# Patient Record
Sex: MALE | Race: WHITE | Employment: FULL TIME | URBAN - METROPOLITAN AREA
[De-identification: names, ages, dates, MRNs, and addresses within clinical notes are randomized per-mention and may not be internally consistent; named-entity substitution may affect disease eponyms.]

---

## 2017-02-03 ENCOUNTER — HOSPITAL ENCOUNTER (OUTPATIENT)
Dept: CV DIAGNOSTICS | Age: 47
Discharge: HOME | End: 2017-02-03
Attending: PREVENTIVE MEDICINE

## 2017-02-03 ENCOUNTER — OFFICE VISIT (OUTPATIENT)
Dept: OTHER | Age: 47
End: 2017-02-03
Attending: PREVENTIVE MEDICINE

## 2017-02-03 DIAGNOSIS — Z00.00 PHYSICAL EXAM: Primary | ICD-10-CM

## 2017-02-03 DIAGNOSIS — Z00.00 PHYSICAL EXAM: ICD-10-CM

## 2017-02-04 NOTE — H&P
[facility]    PATIENT'S NAME: Yolette Thakkar   ATTENDING PHYSICIAN: Sheryle Dibbles, M.D.    PATIENT ACCOUNT #: [de-identified] LOCATION: [place]   MEDICAL RECORD #: [id number] YOB: 1970   ADMISSION DATE: 02/03/2017       [facility] and deferred it to PCP. Please see comprehensive medical form for details. ADDITIONAL TEST RESULTS:  Additional testing was requested and was reviewed. The patient reports no problems using SCBA or respirator mask.   No chest pain, shortness of breat

## 2018-02-07 ENCOUNTER — HOSPITAL ENCOUNTER (OUTPATIENT)
Dept: GENERAL RADIOLOGY | Age: 48
Discharge: HOME | End: 2018-02-07
Attending: FAMILY MEDICINE

## 2018-02-07 ENCOUNTER — OFFICE VISIT (OUTPATIENT)
Dept: OTHER | Age: 48
End: 2018-02-07
Attending: FAMILY MEDICINE

## 2018-02-07 DIAGNOSIS — Z00.00 PHYSICAL EXAM: Primary | ICD-10-CM

## 2018-02-07 LAB
ATRIAL RATE: 74 BPM
P AXIS: 19 DEGREES
P-R INTERVAL: 186 MS
Q-T INTERVAL: 410 MS
QRS DURATION: 112 MS
QTC CALCULATION (BEZET): 455 MS
R AXIS: -18 DEGREES
T AXIS: 21 DEGREES
VENTRICULAR RATE: 74 BPM

## 2018-02-07 PROCEDURE — 93005 ELECTROCARDIOGRAM TRACING: CPT

## 2019-02-20 ENCOUNTER — OFFICE VISIT (OUTPATIENT)
Dept: OTHER | Age: 49
End: 2019-02-20
Attending: FAMILY MEDICINE

## 2019-02-20 DIAGNOSIS — Z00.00 ANNUAL PHYSICAL EXAM: Primary | ICD-10-CM

## 2019-02-20 LAB
ATRIAL RATE: 66 BPM
P AXIS: 28 DEGREES
P-R INTERVAL: 208 MS
Q-T INTERVAL: 414 MS
QRS DURATION: 106 MS
QTC CALCULATION (BEZET): 434 MS
R AXIS: -25 DEGREES
T AXIS: 13 DEGREES
VENTRICULAR RATE: 66 BPM

## 2019-02-20 PROCEDURE — 93005 ELECTROCARDIOGRAM TRACING: CPT

## 2019-05-30 ENCOUNTER — APPOINTMENT (OUTPATIENT)
Dept: ULTRASOUND IMAGING | Age: 49
End: 2019-05-30
Attending: EMERGENCY MEDICINE
Payer: OTHER MISCELLANEOUS

## 2019-05-30 ENCOUNTER — HOSPITAL ENCOUNTER (EMERGENCY)
Age: 49
Discharge: HOME | End: 2019-05-30
Attending: EMERGENCY MEDICINE
Payer: OTHER MISCELLANEOUS

## 2019-05-30 VITALS
BODY MASS INDEX: 29.53 KG/M2 | TEMPERATURE: 98 F | OXYGEN SATURATION: 98 % | RESPIRATION RATE: 16 BRPM | HEIGHT: 72 IN | HEART RATE: 74 BPM | SYSTOLIC BLOOD PRESSURE: 122 MMHG | DIASTOLIC BLOOD PRESSURE: 58 MMHG | WEIGHT: 218 LBS

## 2019-05-30 DIAGNOSIS — S76.219A GROIN STRAIN, INITIAL ENCOUNTER: Primary | ICD-10-CM

## 2019-05-30 PROCEDURE — 93975 VASCULAR STUDY: CPT | Performed by: EMERGENCY MEDICINE

## 2019-05-30 PROCEDURE — 99284 EMERGENCY DEPT VISIT MOD MDM: CPT

## 2019-05-30 PROCEDURE — 76870 US EXAM SCROTUM: CPT | Performed by: EMERGENCY MEDICINE

## 2019-05-30 RX ORDER — FLUTICASONE PROPIONATE 50 MCG
SPRAY, SUSPENSION (ML) NASAL DAILY
COMMUNITY

## 2019-05-30 RX ORDER — IBUPROFEN 800 MG/1
800 TABLET ORAL EVERY 8 HOURS PRN
Qty: 30 TABLET | Refills: 0 | Status: SHIPPED | OUTPATIENT
Start: 2019-05-30 | End: 2019-06-06

## 2019-05-30 RX ORDER — IBUPROFEN 600 MG/1
600 TABLET ORAL ONCE
Status: DISCONTINUED | OUTPATIENT
Start: 2019-05-30 | End: 2019-05-30

## 2019-05-30 NOTE — ED PROVIDER NOTES
Patient Seen in: [facility] Emergency Department In [place]    History   Patient presents with:  Abdomen/Flank Pain (GI/)    Stated Complaint: left groin injury.   works for [employer]    [facility]    42-year-old  from [employer] swelling of the left testis. No hernia was alluded suited. There is no tenderness in the groin. Left lower extremity is neurovascularly intact with full range of motion.     ED Course   Labs Reviewed - No data to display       Ibuprofen was administered

## 2019-06-05 ENCOUNTER — APPOINTMENT (OUTPATIENT)
Dept: OTHER | Age: 49
End: 2019-06-05
Attending: FAMILY MEDICINE
Payer: OTHER MISCELLANEOUS

## 2020-01-22 ENCOUNTER — APPOINTMENT (OUTPATIENT)
Dept: OTHER | Facility: HOSPITAL | Age: 50
End: 2020-01-22
Attending: PREVENTIVE MEDICINE

## 2020-02-24 ENCOUNTER — OFFICE VISIT (OUTPATIENT)
Dept: OTHER | Age: 50
End: 2020-02-24
Attending: FAMILY MEDICINE

## 2020-02-24 DIAGNOSIS — Z00.00 ANNUAL PHYSICAL EXAM: Primary | ICD-10-CM

## 2020-02-24 LAB
ATRIAL RATE: 63 BPM
P AXIS: 34 DEGREES
P-R INTERVAL: 188 MS
Q-T INTERVAL: 396 MS
QRS DURATION: 108 MS
QTC CALCULATION (BEZET): 405 MS
R AXIS: -23 DEGREES
T AXIS: 6 DEGREES
VENTRICULAR RATE: 63 BPM

## 2024-08-18 NOTE — ED AVS SNAPSHOT
-Recommend close follow-up with your primary care doctor.  -A stool culture kit was sent home with you.  Please bring a stool sample back tomorrow for further testing.  -Use Percocet and Zofran for pain and nausea.  -Return to the ER for any worsening of symptoms.   [address] MRN: [id number]    Department:  [address] [facility] in [place]   Date of Visit:  5/30/2019           Disclosure     Insurance plans vary and the physician(s) referred by the ER may not be covered by your plan.  Please c tell this physician (or your personal doctor if your instructions are to return to your personal doctor) about any new or lasting problems. The primary care or specialist physician will see patients referred from the [facility] Emergency Department.  Jasmin Minaya

## (undated) NOTE — MR AVS SNAPSHOT
After Visit Summary   2/3/2017    Keisha Rapp.     MRN: [id number]           Visit Information        Provider Department Dept Phone    2/3/2017  8:00 AM [address] [phone]      Allergies as of 2/3/2017  Reviewed [employer] password. You can change your password at any time. Choose a Security Question and enter your Answer and click Next. This can be used at a later time if you forget your password. Enter your e-mail address.  You will receive e-mail no Don’t forget strength training with weights and resistance Set goals and track your progress   You don’t need to join a gym. Home exercises work great.  Put more priority on exercise in your life                  DM now offers Video Visits through [facility]